# Patient Record
Sex: MALE | URBAN - METROPOLITAN AREA
[De-identification: names, ages, dates, MRNs, and addresses within clinical notes are randomized per-mention and may not be internally consistent; named-entity substitution may affect disease eponyms.]

---

## 2020-07-25 ENCOUNTER — TELEPHONE ENCOUNTER (OUTPATIENT)
Dept: URBAN - METROPOLITAN AREA CLINIC 13 | Facility: CLINIC | Age: 70
End: 2020-07-25

## 2020-07-25 RX ORDER — SODIUM SULFATE, POTASSIUM SULFATE, MAGNESIUM SULFATE 17.5; 3.13; 1.6 G/ML; G/ML; G/ML
5PM THE DAY BEFORE PROCEDURE, DRINK 1/2 OF PREP, THEN 6HOURS BEFORE PROCEDURE DRINK REMAINDER OF PREP SOLUTION, CONCENTRATE ORAL
Qty: 1 | Refills: 0 | OUTPATIENT
Start: 2019-11-14 | End: 2020-01-17

## 2020-07-25 RX ORDER — PHENTERMINE HYDROCHLORIDE 37.5 MG/1
TAKE 1 CAPSULE DAILY CAPSULE ORAL
Refills: 0 | OUTPATIENT
End: 2020-01-17

## 2020-07-25 RX ORDER — AMLODIPINE BESYLATE AND BENAZEPRIL HYDROCHLORIDE 10; 40 MG/1; MG/1
TAKE 1 CAPSULE DAILY. PATIENT STATES UNKNOWN DOSE CAPSULE ORAL
Refills: 0 | OUTPATIENT
End: 2015-03-02

## 2020-07-25 RX ORDER — POLYETHYLENE GLYCOL 3350, SODIUM SULFATE, SODIUM CHLORIDE, POTASSIUM CHLORIDE, ASCORBIC ACID, SODIUM ASCORBATE 7.5-2.691G
TAKE 32 OZ AS DIRECTED 5:00PM THE EVENING BEFORE AND 6HR PRIOR TO PROCEDURE KIT ORAL
Qty: 1 | Refills: 0 | OUTPATIENT
Start: 2014-12-02 | End: 2014-12-31

## 2020-07-26 ENCOUNTER — TELEPHONE ENCOUNTER (OUTPATIENT)
Dept: URBAN - METROPOLITAN AREA CLINIC 13 | Facility: CLINIC | Age: 70
End: 2020-07-26

## 2020-07-26 RX ORDER — AMLODIPINE BESYLATE AND BENAZEPRIL HYDROCHLORIDE 5; 20 MG/1; MG/1
TAKE 1 CAPSULE DAILY CAPSULE ORAL
Refills: 0 | Status: ACTIVE | COMMUNITY

## 2020-07-26 RX ORDER — MULTIVITAMIN
TAKE 1 TABLET DAILY TABLET ORAL
Refills: 0 | Status: ACTIVE | COMMUNITY

## 2020-07-26 RX ORDER — DICLOFENAC SODIUM 1 %
GEL (GRAM) TOPICAL
Qty: 100 | Refills: 0 | Status: ACTIVE | COMMUNITY
Start: 2014-12-18

## 2020-07-26 RX ORDER — FLUOROURACIL 50 MG/G
CREAM TOPICAL
Qty: 40 | Refills: 0 | Status: ACTIVE | COMMUNITY
Start: 2014-02-03

## 2020-07-26 RX ORDER — METHYLPREDNISOLONE 4 MG/1
TABLET ORAL
Qty: 21 | Refills: 0 | Status: ACTIVE | COMMUNITY
Start: 2019-02-21

## 2020-07-26 RX ORDER — FLUOROURACIL 50 MG/G
CREAM TOPICAL
Qty: 40 | Refills: 0 | Status: ACTIVE | COMMUNITY
Start: 2019-07-13

## 2020-07-26 RX ORDER — ATORVASTATIN CALCIUM 80 MG/1
TABLET, FILM COATED ORAL
Qty: 30 | Refills: 0 | Status: ACTIVE | COMMUNITY
Start: 2014-08-25

## 2020-07-26 RX ORDER — ATORVASTATIN CALCIUM 80 MG/1
TABLET, FILM COATED ORAL
Qty: 30 | Refills: 0 | Status: ACTIVE | COMMUNITY
Start: 2015-01-18

## 2025-05-13 ENCOUNTER — LAB OUTSIDE AN ENCOUNTER (OUTPATIENT)
Dept: URBAN - METROPOLITAN AREA CLINIC 113 | Facility: CLINIC | Age: 75
End: 2025-05-13

## 2025-05-13 ENCOUNTER — OFFICE VISIT (OUTPATIENT)
Dept: URBAN - METROPOLITAN AREA CLINIC 113 | Facility: CLINIC | Age: 75
End: 2025-05-13
Payer: COMMERCIAL

## 2025-05-13 ENCOUNTER — DASHBOARD ENCOUNTERS (OUTPATIENT)
Age: 75
End: 2025-05-13

## 2025-05-13 DIAGNOSIS — Z86.0101 HISTORY OF ADENOMATOUS POLYP OF COLON: ICD-10-CM

## 2025-05-13 PROCEDURE — 99213 OFFICE O/P EST LOW 20 MIN: CPT | Performed by: NURSE PRACTITIONER

## 2025-05-13 RX ORDER — SODIUM, POTASSIUM,MAG SULFATES 17.5-3.13G
354 ML SOLUTION, RECONSTITUTED, ORAL ORAL ONCE
Qty: 354 MILLILITER | Refills: 0 | OUTPATIENT
Start: 2025-05-13 | End: 2025-05-14

## 2025-05-13 RX ORDER — ATORVASTATIN CALCIUM 80 MG/1
TABLET, FILM COATED ORAL
Qty: 30 | Refills: 0 | Status: ACTIVE | COMMUNITY
Start: 2014-08-25

## 2025-05-13 RX ORDER — METHYLPREDNISOLONE 4 MG/1
TABLET ORAL
Qty: 21 | Refills: 0 | Status: ON HOLD | COMMUNITY
Start: 2019-02-21

## 2025-05-13 RX ORDER — FLUOROURACIL 50 MG/G
CREAM TOPICAL
Qty: 40 | Refills: 0 | Status: ON HOLD | COMMUNITY
Start: 2014-02-03

## 2025-05-13 RX ORDER — MULTIVITAMIN
TAKE 1 TABLET DAILY TABLET ORAL
Refills: 0 | Status: ACTIVE | COMMUNITY

## 2025-05-13 RX ORDER — DICLOFENAC SODIUM 1 %
GEL (GRAM) TOPICAL
Qty: 100 | Refills: 0 | Status: ON HOLD | COMMUNITY
Start: 2014-12-18

## 2025-05-13 RX ORDER — AMLODIPINE BESYLATE AND BENAZEPRIL HYDROCHLORIDE 5; 20 MG/1; MG/1
TAKE 1 CAPSULE DAILY CAPSULE ORAL
Refills: 0 | Status: ON HOLD | COMMUNITY

## 2025-05-13 NOTE — HPI-TODAY'S VISIT:
73 yo male with with a history of hypertension, hyperlipidemia, and history of colon polyps, due for surveillance colonoscopy.  His last colonoscopy in January 2020 was notable for the removal of a 2 mm tubular adenoma from the ascending colon, diverticulosis, otherwise normal exam. A repeat colonoscopy was recommended in 5 years time.  He was recently started on Zepbound for sleep apnea. He completed a sleep study with Dr. Pedraza, found to have sleep apnea. He has been on Zepbound for 4 weeks, and CPAP for one week. He has lost weight using Zepbound, estimated to be down about 10 pounds since starting Zepbound. He is not on supplemental O2. No history of cardiac disease, stent placement, AICD or pacemaker. No history of seizures or kidney dialysis.  He is without any abdominal complaints. No dysphagia, heartburn, regurgitation, unintentional weight loss, nausea, vomiting, hematemesis or melena. Bowels are moving regularly without blood per rectum. No complaints of bloating. No jaundice, icterus.

## 2025-06-17 ENCOUNTER — TELEPHONE ENCOUNTER (OUTPATIENT)
Dept: URBAN - METROPOLITAN AREA CLINIC 113 | Facility: CLINIC | Age: 75
End: 2025-06-17

## 2025-06-17 ENCOUNTER — OFFICE VISIT (OUTPATIENT)
Dept: URBAN - METROPOLITAN AREA SURGERY CENTER 25 | Facility: SURGERY CENTER | Age: 75
End: 2025-06-17
Payer: COMMERCIAL

## 2025-06-17 ENCOUNTER — CLAIMS CREATED FROM THE CLAIM WINDOW (OUTPATIENT)
Dept: URBAN - METROPOLITAN AREA CLINIC 4 | Facility: CLINIC | Age: 75
End: 2025-06-17
Payer: COMMERCIAL

## 2025-06-17 DIAGNOSIS — Z86.0101 H/O ADENOMATOUS POLYP OF COLON: ICD-10-CM

## 2025-06-17 DIAGNOSIS — Z86.0101 PERSONAL HISTORY OF ADENOMATOUS AND SERRATED COLON POLYPS: ICD-10-CM

## 2025-06-17 DIAGNOSIS — I10 ESSENTIAL HYPERTENSION: ICD-10-CM

## 2025-06-17 DIAGNOSIS — D12.3 ADENOMA OF TRANSVERSE COLON: ICD-10-CM

## 2025-06-17 DIAGNOSIS — D12.3 BENIGN NEOPLASM OF TRANSVERSE COLON: ICD-10-CM

## 2025-06-17 DIAGNOSIS — Z09 ENCOUNTER FOR FOLLOW-UP EXAMINATION AFTER COMPLETED TREATMENT FOR CONDITIONS OTHER THAN MALIGNANT NEOPLASM: ICD-10-CM

## 2025-06-17 DIAGNOSIS — Z86.0100 PERSONAL HISTORY OF COLON POLYPS, UNSPECIFIED: ICD-10-CM

## 2025-06-17 DIAGNOSIS — K63.5 BENIGN COLON POLYP: ICD-10-CM

## 2025-06-17 PROCEDURE — 0529F INTRVL 3/>YR PTS CLNSCP DOCD: CPT | Performed by: INTERNAL MEDICINE

## 2025-06-17 PROCEDURE — 45385 COLONOSCOPY W/LESION REMOVAL: CPT | Performed by: INTERNAL MEDICINE

## 2025-06-17 PROCEDURE — 00812 ANES LWR INTST SCR COLSC: CPT | Performed by: ANESTHESIOLOGY

## 2025-06-17 PROCEDURE — 88305 TISSUE EXAM BY PATHOLOGIST: CPT | Performed by: PATHOLOGY

## 2025-06-17 RX ORDER — AMLODIPINE BESYLATE AND BENAZEPRIL HYDROCHLORIDE 5; 20 MG/1; MG/1
TAKE 1 CAPSULE DAILY CAPSULE ORAL
Refills: 0 | Status: ON HOLD | COMMUNITY

## 2025-06-17 RX ORDER — MULTIVITAMIN
TAKE 1 TABLET DAILY TABLET ORAL
Refills: 0 | Status: ACTIVE | COMMUNITY

## 2025-06-17 RX ORDER — METHYLPREDNISOLONE 4 MG/1
TABLET ORAL
Qty: 21 | Refills: 0 | Status: ON HOLD | COMMUNITY
Start: 2019-02-21

## 2025-06-17 RX ORDER — DICLOFENAC SODIUM 1 %
GEL (GRAM) TOPICAL
Qty: 100 | Refills: 0 | Status: ON HOLD | COMMUNITY
Start: 2014-12-18

## 2025-06-17 RX ORDER — FLUOROURACIL 50 MG/G
CREAM TOPICAL
Qty: 40 | Refills: 0 | Status: ON HOLD | COMMUNITY
Start: 2014-02-03

## 2025-06-17 RX ORDER — ATORVASTATIN CALCIUM 80 MG/1
TABLET, FILM COATED ORAL
Qty: 30 | Refills: 0 | Status: ACTIVE | COMMUNITY
Start: 2014-08-25

## 2025-07-11 ENCOUNTER — LAB OUTSIDE AN ENCOUNTER (OUTPATIENT)
Dept: URBAN - METROPOLITAN AREA CLINIC 113 | Facility: CLINIC | Age: 75
End: 2025-07-11

## 2025-07-11 ENCOUNTER — OFFICE VISIT (OUTPATIENT)
Dept: URBAN - METROPOLITAN AREA CLINIC 113 | Facility: CLINIC | Age: 75
End: 2025-07-11
Payer: COMMERCIAL

## 2025-07-11 DIAGNOSIS — K63.5 CECAL POLYP: ICD-10-CM

## 2025-07-11 DIAGNOSIS — Z86.0101 H/O ADENOMATOUS POLYP OF COLON: ICD-10-CM

## 2025-07-11 PROCEDURE — 99214 OFFICE O/P EST MOD 30 MIN: CPT | Performed by: INTERNAL MEDICINE

## 2025-07-11 RX ORDER — METHYLPREDNISOLONE 4 MG/1
TABLET ORAL
Qty: 21 | Refills: 0 | Status: ON HOLD | COMMUNITY
Start: 2019-02-21

## 2025-07-11 RX ORDER — FLUOROURACIL 50 MG/G
CREAM TOPICAL
Qty: 40 | Refills: 0 | Status: ON HOLD | COMMUNITY
Start: 2014-02-03

## 2025-07-11 RX ORDER — MULTIVITAMIN
TAKE 1 TABLET DAILY TABLET ORAL
Refills: 0 | Status: ACTIVE | COMMUNITY

## 2025-07-11 RX ORDER — AMLODIPINE BESYLATE AND BENAZEPRIL HYDROCHLORIDE 5; 20 MG/1; MG/1
TAKE 1 CAPSULE DAILY CAPSULE ORAL
Refills: 0 | Status: ON HOLD | COMMUNITY

## 2025-07-11 RX ORDER — POLYETHYLENE GLYCOL 3350, SODIUM CHLORIDE, SODIUM BICARBONATE, POTASSIUM CHLORIDE 420; 11.2; 5.72; 1.48 G/4L; G/4L; G/4L; G/4L
4000 ML POWDER, FOR SOLUTION ORAL ONCE
Qty: 4000 MILLILITER | Refills: 0 | OUTPATIENT
Start: 2025-07-11 | End: 2025-07-12

## 2025-07-11 RX ORDER — ATORVASTATIN CALCIUM 80 MG/1
TABLET, FILM COATED ORAL
Qty: 30 | Refills: 0 | Status: ACTIVE | COMMUNITY
Start: 2014-08-25

## 2025-07-11 RX ORDER — DICLOFENAC SODIUM 1 %
GEL (GRAM) TOPICAL
Qty: 100 | Refills: 0 | Status: ON HOLD | COMMUNITY
Start: 2014-12-18

## 2025-07-11 NOTE — HPI-TODAY'S VISIT:
Patient presents today in follow-up.  He recent underwent colonoscopy which revealed tubular adenomas which were removed.  He also has a large sessile carpeting polyp in his cecum.  He reports he did well after his colonoscopy.  No bleeding or other lower GI complaints.  He has had a fall since that procedure and broke his right elbow.  Is currently in a sling.  He has had surgery on it and tells me that it is progressing nicely and expects to be out of everything soon.  He is able to lay on his left side.

## 2025-07-16 ENCOUNTER — OFFICE VISIT (OUTPATIENT)
Dept: URBAN - METROPOLITAN AREA CLINIC 113 | Facility: CLINIC | Age: 75
End: 2025-07-16

## 2025-07-28 ENCOUNTER — OFFICE VISIT (OUTPATIENT)
Dept: URBAN - METROPOLITAN AREA MEDICAL CENTER 19 | Facility: MEDICAL CENTER | Age: 75
End: 2025-07-28

## 2025-07-28 RX ORDER — ATORVASTATIN CALCIUM 80 MG/1
TABLET, FILM COATED ORAL
Qty: 30 | Refills: 0 | Status: ACTIVE | COMMUNITY
Start: 2014-08-25

## 2025-07-28 RX ORDER — FLUOROURACIL 50 MG/G
CREAM TOPICAL
Qty: 40 | Refills: 0 | Status: ON HOLD | COMMUNITY
Start: 2014-02-03

## 2025-07-28 RX ORDER — AMLODIPINE BESYLATE AND BENAZEPRIL HYDROCHLORIDE 5; 20 MG/1; MG/1
TAKE 1 CAPSULE DAILY CAPSULE ORAL
Refills: 0 | Status: ON HOLD | COMMUNITY

## 2025-07-28 RX ORDER — METHYLPREDNISOLONE 4 MG/1
TABLET ORAL
Qty: 21 | Refills: 0 | Status: ON HOLD | COMMUNITY
Start: 2019-02-21

## 2025-07-28 RX ORDER — MULTIVITAMIN
TAKE 1 TABLET DAILY TABLET ORAL
Refills: 0 | Status: ACTIVE | COMMUNITY

## 2025-07-28 RX ORDER — DICLOFENAC SODIUM 1 %
GEL (GRAM) TOPICAL
Qty: 100 | Refills: 0 | Status: ON HOLD | COMMUNITY
Start: 2014-12-18

## 2025-08-01 ENCOUNTER — TELEPHONE ENCOUNTER (OUTPATIENT)
Dept: URBAN - METROPOLITAN AREA CLINIC 113 | Facility: CLINIC | Age: 75
End: 2025-08-01